# Patient Record
Sex: FEMALE | Race: BLACK OR AFRICAN AMERICAN | NOT HISPANIC OR LATINO | URBAN - NONMETROPOLITAN AREA
[De-identification: names, ages, dates, MRNs, and addresses within clinical notes are randomized per-mention and may not be internally consistent; named-entity substitution may affect disease eponyms.]

---

## 2023-07-13 ENCOUNTER — OFFICE (OUTPATIENT)
Dept: URBAN - NONMETROPOLITAN AREA CLINIC 5 | Facility: CLINIC | Age: 38
End: 2023-07-13
Payer: COMMERCIAL

## 2023-07-13 VITALS
WEIGHT: 130 LBS | SYSTOLIC BLOOD PRESSURE: 96 MMHG | RESPIRATION RATE: 19 BRPM | HEIGHT: 67 IN | HEART RATE: 74 BPM | DIASTOLIC BLOOD PRESSURE: 60 MMHG

## 2023-07-13 DIAGNOSIS — R63.4 ABNORMAL WEIGHT LOSS: ICD-10-CM

## 2023-07-13 DIAGNOSIS — R13.10 DYSPHAGIA, UNSPECIFIED: ICD-10-CM

## 2023-07-13 DIAGNOSIS — F45.8 OTHER SOMATOFORM DISORDERS: ICD-10-CM

## 2023-07-13 PROCEDURE — 99204 OFFICE O/P NEW MOD 45 MIN: CPT | Performed by: INTERNAL MEDICINE

## 2023-07-13 RX ORDER — OMEPRAZOLE 20 MG/1
CAPSULE, DELAYED RELEASE ORAL
Qty: 30 | Refills: 11 | Status: ACTIVE
Start: 2023-07-13

## 2023-07-13 NOTE — SERVICENOTES
Given patient's globus, dysphagia, weight loss will plan to obtain an esophagram and EGD as above.  If EGD is unremarkable will proceed with manometry study.  Will start her back on a PPI once daily 30-45 minutes prior to breakfast to assess for improvement in her symptoms.  In the future will taper this down to 20 mg from the lowest dose that controls her symptoms.

## 2023-07-13 NOTE — SERVICEHPINOTES
Mikayla Constantino   is a   36 yo  female   with a past medical history of sickle cell trait and GERD who presents to establish care for globus, weight loss, dysphagia, and heartburn.  Patient reports that she previously was developing a tightness in her chest and throat which ultimately prompted ER evaluation.  She was seen by the ENT who did a scope of her throat and thought they saw irritation.  She  previously was experiencing a "tightness" in her throat which affected her eating and with this she lost 25 lbs.  She does believe it was stress related.  She no longer has the tightness but does feel like there is a lump in her throat.  She has changed her diet and attributes these dietary changes for the improvement in her symptoms.  She previously was on pantoprazole 20 mg b.i.d. but was having a migraine after taking the 2nd dose so she stopped this.  She continues on famotidine but has not seen significant improvement in her symptoms.  She recently completed nursing school.  She denies smoking, EtOH use, or illicit drug use.  She will occasionally take NSAIDs.  She has never had an EGD or esophagram.

## 2023-08-15 ENCOUNTER — OFFICE (OUTPATIENT)
Dept: URBAN - METROPOLITAN AREA PATHOLOGY 16 | Facility: PATHOLOGY | Age: 38
End: 2023-08-15
Payer: COMMERCIAL

## 2023-08-15 ENCOUNTER — AMBULATORY SURGICAL CENTER (OUTPATIENT)
Dept: URBAN - NONMETROPOLITAN AREA SURGERY 4 | Facility: SURGERY | Age: 38
End: 2023-08-15
Payer: COMMERCIAL

## 2023-08-15 VITALS
OXYGEN SATURATION: 98 % | TEMPERATURE: 97.6 F | DIASTOLIC BLOOD PRESSURE: 77 MMHG | HEIGHT: 67 IN | HEART RATE: 99 BPM | RESPIRATION RATE: 14 BRPM | RESPIRATION RATE: 19 BRPM | SYSTOLIC BLOOD PRESSURE: 105 MMHG | DIASTOLIC BLOOD PRESSURE: 59 MMHG | TEMPERATURE: 97.9 F | WEIGHT: 129 LBS | TEMPERATURE: 98 F | OXYGEN SATURATION: 100 % | DIASTOLIC BLOOD PRESSURE: 81 MMHG | SYSTOLIC BLOOD PRESSURE: 92 MMHG | DIASTOLIC BLOOD PRESSURE: 55 MMHG | OXYGEN SATURATION: 98 % | RESPIRATION RATE: 17 BRPM | HEART RATE: 81 BPM | SYSTOLIC BLOOD PRESSURE: 93 MMHG | TEMPERATURE: 97.9 F | DIASTOLIC BLOOD PRESSURE: 59 MMHG | SYSTOLIC BLOOD PRESSURE: 115 MMHG | DIASTOLIC BLOOD PRESSURE: 81 MMHG | RESPIRATION RATE: 16 BRPM | OXYGEN SATURATION: 98 % | SYSTOLIC BLOOD PRESSURE: 105 MMHG | RESPIRATION RATE: 19 BRPM | TEMPERATURE: 97.6 F | OXYGEN SATURATION: 99 % | HEART RATE: 77 BPM | RESPIRATION RATE: 16 BRPM | HEART RATE: 70 BPM | TEMPERATURE: 97.6 F | DIASTOLIC BLOOD PRESSURE: 73 MMHG | DIASTOLIC BLOOD PRESSURE: 73 MMHG | OXYGEN SATURATION: 99 % | RESPIRATION RATE: 18 BRPM | DIASTOLIC BLOOD PRESSURE: 77 MMHG | SYSTOLIC BLOOD PRESSURE: 123 MMHG | HEIGHT: 67 IN | SYSTOLIC BLOOD PRESSURE: 125 MMHG | TEMPERATURE: 98 F | HEART RATE: 81 BPM | DIASTOLIC BLOOD PRESSURE: 62 MMHG | HEART RATE: 72 BPM | RESPIRATION RATE: 17 BRPM | RESPIRATION RATE: 14 BRPM | SYSTOLIC BLOOD PRESSURE: 93 MMHG | SYSTOLIC BLOOD PRESSURE: 93 MMHG | DIASTOLIC BLOOD PRESSURE: 59 MMHG | HEART RATE: 70 BPM | DIASTOLIC BLOOD PRESSURE: 62 MMHG | OXYGEN SATURATION: 100 % | HEIGHT: 67 IN | SYSTOLIC BLOOD PRESSURE: 115 MMHG | OXYGEN SATURATION: 100 % | DIASTOLIC BLOOD PRESSURE: 62 MMHG | DIASTOLIC BLOOD PRESSURE: 54 MMHG | HEART RATE: 74 BPM | TEMPERATURE: 97.9 F | WEIGHT: 129 LBS | HEART RATE: 81 BPM | HEART RATE: 72 BPM | DIASTOLIC BLOOD PRESSURE: 54 MMHG | WEIGHT: 129 LBS | HEART RATE: 77 BPM | DIASTOLIC BLOOD PRESSURE: 55 MMHG | RESPIRATION RATE: 18 BRPM | RESPIRATION RATE: 16 BRPM | SYSTOLIC BLOOD PRESSURE: 106 MMHG | DIASTOLIC BLOOD PRESSURE: 54 MMHG | SYSTOLIC BLOOD PRESSURE: 101 MMHG | SYSTOLIC BLOOD PRESSURE: 123 MMHG | DIASTOLIC BLOOD PRESSURE: 81 MMHG | SYSTOLIC BLOOD PRESSURE: 101 MMHG | HEART RATE: 74 BPM | DIASTOLIC BLOOD PRESSURE: 77 MMHG | HEART RATE: 72 BPM | SYSTOLIC BLOOD PRESSURE: 125 MMHG | DIASTOLIC BLOOD PRESSURE: 73 MMHG | SYSTOLIC BLOOD PRESSURE: 123 MMHG | RESPIRATION RATE: 14 BRPM | SYSTOLIC BLOOD PRESSURE: 92 MMHG | TEMPERATURE: 98 F | OXYGEN SATURATION: 99 % | RESPIRATION RATE: 18 BRPM | SYSTOLIC BLOOD PRESSURE: 106 MMHG | SYSTOLIC BLOOD PRESSURE: 106 MMHG | HEART RATE: 77 BPM | RESPIRATION RATE: 19 BRPM | DIASTOLIC BLOOD PRESSURE: 55 MMHG | SYSTOLIC BLOOD PRESSURE: 115 MMHG | SYSTOLIC BLOOD PRESSURE: 125 MMHG | HEART RATE: 70 BPM | HEART RATE: 74 BPM | SYSTOLIC BLOOD PRESSURE: 105 MMHG | RESPIRATION RATE: 17 BRPM | HEART RATE: 99 BPM | HEART RATE: 99 BPM | SYSTOLIC BLOOD PRESSURE: 101 MMHG | SYSTOLIC BLOOD PRESSURE: 92 MMHG

## 2023-08-15 DIAGNOSIS — R09.89 OTHER SPECIFIED SYMPTOMS AND SIGNS INVOLVING THE CIRCULATORY: ICD-10-CM

## 2023-08-15 DIAGNOSIS — R63.4 ABNORMAL WEIGHT LOSS: ICD-10-CM

## 2023-08-15 DIAGNOSIS — R13.10 DYSPHAGIA, UNSPECIFIED: ICD-10-CM

## 2023-08-15 DIAGNOSIS — K22.89 OTHER SPECIFIED DISEASE OF ESOPHAGUS: ICD-10-CM

## 2023-08-15 DIAGNOSIS — R10.13 EPIGASTRIC PAIN: ICD-10-CM

## 2023-08-15 PROCEDURE — 88305 TISSUE EXAM BY PATHOLOGIST: CPT | Performed by: STUDENT IN AN ORGANIZED HEALTH CARE EDUCATION/TRAINING PROGRAM

## 2023-08-15 PROCEDURE — 43450 DILATE ESOPHAGUS 1/MULT PASS: CPT | Performed by: INTERNAL MEDICINE

## 2023-08-15 PROCEDURE — 00731 ANES UPR GI NDSC PX NOS: CPT | Mod: QZ | Performed by: NURSE ANESTHETIST, CERTIFIED REGISTERED

## 2023-08-15 PROCEDURE — 43239 EGD BIOPSY SINGLE/MULTIPLE: CPT | Performed by: INTERNAL MEDICINE

## 2023-08-18 LAB
GASTRO ONE PATHOLOGY: PDF REPORT: (no result)

## 2023-10-17 ENCOUNTER — OFFICE (OUTPATIENT)
Dept: URBAN - NONMETROPOLITAN AREA CLINIC 5 | Facility: CLINIC | Age: 38
End: 2023-10-17
Payer: COMMERCIAL

## 2023-10-17 VITALS
HEART RATE: 74 BPM | RESPIRATION RATE: 20 BRPM | SYSTOLIC BLOOD PRESSURE: 116 MMHG | DIASTOLIC BLOOD PRESSURE: 87 MMHG | WEIGHT: 133 LBS | HEIGHT: 67 IN

## 2023-10-17 DIAGNOSIS — F45.8 OTHER SOMATOFORM DISORDERS: ICD-10-CM

## 2023-10-17 DIAGNOSIS — R09.89 OTHER SPECIFIED SYMPTOMS AND SIGNS INVOLVING THE CIRCULATORY: ICD-10-CM

## 2023-10-17 PROCEDURE — 99213 OFFICE O/P EST LOW 20 MIN: CPT | Performed by: INTERNAL MEDICINE

## 2023-10-17 NOTE — SERVICEHPINOTES
Mikayla Constantino   is a   38   year old  female  with a past medical history of sickle cell trait and GERD who presents for follow up of globus, weight loss, dysphagia, and heartburn.  Patient at her initial visit reported that she previously was developing a tightness in her chest and throat which ultimately prompted ER evaluation.  She was seen by ENT who performed a scope of her throat and thought they saw irritation.  She  previously was experiencing a "tightness" in her throat which affected her eating and with this she lost 25 lbs.  She does believe it was stress related.  She was no longer having the tightness but did feel like there was a lump in her throat.  She had changed her diet and attributed this to dietary changes.  She previously was on pantoprazole 20 mg b.i.d. but was having a migraine after taking the 2nd dose so she stopped this.  She continued on famotidine but had not seen significant improvement in her symptoms.  She recently completed nursing school.  She denied smoking, EtOH use, or illicit drug use. Patient after last visit completed a esophagram which was unremarkable. She completed a subsequent EGD on 08/15/2023 which showed normal mucosa the esophagus, stomach, small bowel. Esophageal dilation was performed with a 40 [place] Go dilator. Esophageal biopsies revealed mild reactive changes but no evidence of EOE or Sweeney's esophagus. Patient presents for follow-up today.   Patient reports that her dysphagia has resolved but she continues with feelings of globus at times and the constant need to clear her throat   Particularly after eating.  She continues omeprazole daily 30-45 minutes prior to breakfast.  She only takes famotidine which she has feelings of something in her throat and it tends to resolve her symptoms.  She reports that overall it is fairly bothersome to her and she would like to undergo manometry study and consider amitriptyline or buspirone for likely globus.

## 2023-10-17 NOTE — SERVICENOTES
given patient's persistent throat clearing and feelings of globus will plan for manometry study.  If manometry is unremarkable would consider starting on buspirone or likely a TCA with amitriptyline and counseled on this today.

## 2023-12-28 ENCOUNTER — ON CAMPUS - OUTPATIENT (OUTPATIENT)
Dept: URBAN - NONMETROPOLITAN AREA HOSPITAL 28 | Facility: HOSPITAL | Age: 38
End: 2023-12-28
Payer: COMMERCIAL

## 2023-12-28 DIAGNOSIS — R09.A2 FOREIGN BODY SENSATION, THROAT: ICD-10-CM

## 2023-12-28 DIAGNOSIS — R09.89 OTHER SPECIFIED SYMPTOMS AND SIGNS INVOLVING THE CIRCULATORY: ICD-10-CM

## 2023-12-28 PROCEDURE — 91010 ESOPHAGUS MOTILITY STUDY: CPT | Mod: 26 | Performed by: INTERNAL MEDICINE

## 2024-02-26 ENCOUNTER — OFFICE (OUTPATIENT)
Dept: URBAN - NONMETROPOLITAN AREA CLINIC 5 | Facility: CLINIC | Age: 39
End: 2024-02-26
Payer: COMMERCIAL

## 2024-02-26 VITALS
DIASTOLIC BLOOD PRESSURE: 76 MMHG | RESPIRATION RATE: 19 BRPM | SYSTOLIC BLOOD PRESSURE: 112 MMHG | HEIGHT: 67 IN | HEART RATE: 75 BPM | WEIGHT: 143 LBS

## 2024-02-26 DIAGNOSIS — R13.10 DYSPHAGIA, UNSPECIFIED: ICD-10-CM

## 2024-02-26 DIAGNOSIS — R09.89 OTHER SPECIFIED SYMPTOMS AND SIGNS INVOLVING THE CIRCULATORY: ICD-10-CM

## 2024-02-26 PROCEDURE — 99214 OFFICE O/P EST MOD 30 MIN: CPT | Performed by: INTERNAL MEDICINE

## 2024-02-26 RX ORDER — FAMOTIDINE 40 MG/1
40 TABLET, FILM COATED ORAL
Qty: 30 | Refills: 11 | Status: ACTIVE

## 2024-02-26 RX ORDER — OMEPRAZOLE 40 MG/1
CAPSULE, DELAYED RELEASE ORAL
Qty: 30 | Refills: 11 | Status: ACTIVE
Start: 2024-02-26

## 2024-02-26 NOTE — SERVICENOTES
Given patient's manometry revealing 30% weak swallows counseled her on likely component of esophageal motility disorder.  She has stopped the omeprazole since last evaluation.  Her symptoms have recurred.  Counseled her on the need to be back on a PPI and to continue on Pepcid.  If despite these measures with continued symptoms would plan for Bravo pH probe placement in anticipation of anti-reflux surgery.  Will plan to  have [facility] touch base with her in 6-8 weeks to assess her symptoms and if with persistent symptoms will plan for Bravo pH probe off therapy.

## 2024-02-26 NOTE — SERVICEHPINOTES
Mikayla Constantino   is a   38   year old  female   with a past medical history of sickle cell trait and GERD who presents for follow up of globus, weight loss, dysphagia, and heartburn.  Patient at her initial visit reported that she previously was developing a tightness in her chest and throat which ultimately prompted ER evaluation.  She was seen by ENT who performed a scope of her throat and thought they saw irritation.  She  previously was experiencing a "tightness" in her throat which affected her eating and with this she had lost 25 lbs.  She did believe it was stress related.  She was no longer experiencing the tightness but did feel like there was a lump in her throat.  She had changed her diet and attributed this to dietary changes.  She previously was on pantoprazole 20 mg b.i.d. but experienced a migraine after taking the 2nd dose so she stopped it.  She continued on famotidine but had not seen significant improvement in her symptoms.  She recently completed nursing school.  She denied smoking, EtOH use, or illicit drug use. Patient completed an esophagram which was unremarkable. She completed a subsequent EGD on 08/15/2023 which showed normal mucosa the esophagus, stomach, small bowel. Esophageal dilation was performed with a 40 [place] Go dilator. Esophageal biopsies revealed mild reactive changes but no evidence of EOE or Sweeney's esophagus. Patient at prior follow up reported that her dysphagia had resolved but she continued with feelings of globus at times and the constant need to clear her throat particularly after eating.  She continued on omeprazole daily 30-45 minutes prior to breakfast.  She would only take famotidine when she had feelings of something in her throat and it did tend to resolve her symptoms. Patient at last visit reported that her symptoms were quite bothersome to her and she would like to undergo manometry study. Manometry study was completed in December 2023 and revealed normal supine and upright bolus clearance on impedance evaluation with normal esophageal body function. This was classified as a “normal” motility study, however, given the number of ineffective swallows (30%) there was concern for some degree of motility compromise. Patient presents now for follow-up. 
br
br   Patient reports today she still continues to have episodes where food feels like food is not going down.  She does note that if she takes the Pepcid and avoids known trigger foods her symptoms are minimal.  She has noted chocolate and fried foods tend to make symptoms worse.  She stopped the omeprazole since last evaluation and did have worsening of her symptoms.  She did not attempt to restart it.  She is working the night shift and counseled her this could be contributing to her symptoms as well.

## 2024-07-03 ENCOUNTER — OFFICE (OUTPATIENT)
Dept: URBAN - NONMETROPOLITAN AREA CLINIC 5 | Facility: CLINIC | Age: 39
End: 2024-07-03
Payer: COMMERCIAL

## 2024-07-03 VITALS
HEART RATE: 69 BPM | DIASTOLIC BLOOD PRESSURE: 98 MMHG | WEIGHT: 141 LBS | RESPIRATION RATE: 16 BRPM | SYSTOLIC BLOOD PRESSURE: 123 MMHG | HEIGHT: 67 IN

## 2024-07-03 DIAGNOSIS — R09.89 OTHER SPECIFIED SYMPTOMS AND SIGNS INVOLVING THE CIRCULATORY: ICD-10-CM

## 2024-07-03 DIAGNOSIS — K22.4 DYSKINESIA OF ESOPHAGUS: ICD-10-CM

## 2024-07-03 DIAGNOSIS — R09.A2 FOREIGN BODY SENSATION, THROAT: ICD-10-CM

## 2024-07-03 PROCEDURE — 99214 OFFICE O/P EST MOD 30 MIN: CPT | Performed by: INTERNAL MEDICINE

## 2024-07-03 RX ORDER — AMITRIPTYLINE HYDROCHLORIDE 25 MG/1
25 TABLET, FILM COATED ORAL
Qty: 30 | Refills: 5 | Status: ACTIVE
Start: 2024-07-03

## 2024-07-03 RX ORDER — AMITRIPTYLINE HYDROCHLORIDE 10 MG/1
TABLET, FILM COATED ORAL
Qty: 14 | Refills: 0 | Status: ACTIVE
Start: 2024-07-03

## 2024-07-03 NOTE — SERVICENOTES
Given patient's persistent globus and feeling of a lump in the left side of her neck will plan for CT scan to rule out soft tissue abnormality contributing.  She can continue on the omeprazole and the Pepcid daily.  Will attempt amitriptyline for suspected globus.  Counseled her at follow-up in 3 months if all this is unremarkable would plan for Bravo pH probe to rule out reflux contributing to her persistent symptoms.

## 2024-07-03 NOTE — SERVICEHPINOTES
Mikayla Constantino   is a   38   year old  female   with a past medical history of sickle cell trait and GERD who presents for follow up of globus, weight loss, dysphagia, and heartburn.  Patient at her initial visit reported that she previously was developing a tightness in her chest and throat which ultimately prompted ER evaluation.  She was seen by ENT who performed a scope of her throat and thought they saw irritation.  She  previously was experiencing a "tightness" in her throat which affected her eating and with this she had lost 25 lbs.  She did believe it was stress related.  She was no longer experiencing the tightness but did feel like there was a lump in her throat.  She had changed her diet and attributed this to dietary changes.  She previously was on pantoprazole 20 mg b.i.d. but experienced a migraine after taking the 2nd dose so she stopped it.  She continued on famotidine but had not seen significant improvement in her symptoms.  She recently completed nursing school.  She denied smoking, EtOH use, or illicit drug use. Patient completed an esophagram which was unremarkable. She completed a subsequent EGD on 08/15/2023 which showed normal mucosa the esophagus, stomach, small bowel. Esophageal dilation was performed with a 40 [place] Go dilator. Esophageal biopsies revealed mild reactive changes but no evidence of EOE or Sweeney's esophagus. Patient at prior follow up reported that her dysphagia had resolved but she continued with feelings of globus at times and the constant need to clear her throat particularly after eating.  She continued on omeprazole daily 30-45 minutes prior to breakfast.  She would only take famotidine when she had feelings of something in her throat and it did tend to resolve her symptoms. Patient at last visit reported that her symptoms were quite bothersome to her and she would like to undergo manometry study. Manometry study was completed in December 2023 and revealed normal supine and upright bolus clearance on impedance evaluation with normal esophageal body function. This was classified as a “normal” motility study, however, given the number of ineffective swallows (30%) there was concern for some degree of motility compromise. Patient presents now for follow-up.   Patient reports   Today she is continued with feeling like there is a lump on the left side of her throat.  She feels like food goes down but the lump will occur after eating.  Itr does not happen with every meal.  She does report coughing and throat clearing.  She denies any heartburn or acid reflux.  She continues on omeprazole famotidine.  She does report anxiety.  She has never been on amitriptyline.  She has never been on Lexapro.  She has not had any cross-sectional imaging of the neck.